# Patient Record
Sex: FEMALE | Race: WHITE | NOT HISPANIC OR LATINO | URBAN - METROPOLITAN AREA
[De-identification: names, ages, dates, MRNs, and addresses within clinical notes are randomized per-mention and may not be internally consistent; named-entity substitution may affect disease eponyms.]

---
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
## 2024-05-07 NOTE — PROCEDURE: ORDER TESTS
81
Billing Type: Third-Party Bill
Bill For Surgical Tray: no
Expected Date Of Service: 02/10/2020
Performing Laboratory: [id number]